# Patient Record
Sex: MALE | Race: OTHER | NOT HISPANIC OR LATINO | URBAN - METROPOLITAN AREA
[De-identification: names, ages, dates, MRNs, and addresses within clinical notes are randomized per-mention and may not be internally consistent; named-entity substitution may affect disease eponyms.]

---

## 2017-12-06 PROBLEM — Z00.00 ENCOUNTER FOR PREVENTIVE HEALTH EXAMINATION: Status: ACTIVE | Noted: 2017-12-06

## 2017-12-07 ENCOUNTER — OUTPATIENT (OUTPATIENT)
Dept: OUTPATIENT SERVICES | Facility: HOSPITAL | Age: 32
LOS: 1 days | End: 2017-12-07

## 2017-12-07 ENCOUNTER — APPOINTMENT (OUTPATIENT)
Dept: MRI IMAGING | Facility: CLINIC | Age: 32
End: 2017-12-07
Payer: COMMERCIAL

## 2017-12-07 DIAGNOSIS — Z00.8 ENCOUNTER FOR OTHER GENERAL EXAMINATION: ICD-10-CM

## 2017-12-07 PROCEDURE — 73221 MRI JOINT UPR EXTREM W/O DYE: CPT | Mod: 26,LT

## 2019-09-24 ENCOUNTER — APPOINTMENT (OUTPATIENT)
Dept: FAMILY MEDICINE | Facility: CLINIC | Age: 34
End: 2019-09-24
Payer: COMMERCIAL

## 2019-09-24 VITALS
BODY MASS INDEX: 26.37 KG/M2 | OXYGEN SATURATION: 98 % | HEART RATE: 78 BPM | WEIGHT: 168 LBS | HEIGHT: 67 IN | DIASTOLIC BLOOD PRESSURE: 76 MMHG | SYSTOLIC BLOOD PRESSURE: 122 MMHG | RESPIRATION RATE: 16 BRPM

## 2019-09-24 DIAGNOSIS — Z13.39 ENCOUNTER FOR SCREENING EXAM FOR OTHER MENTAL HEALTH AND BEHAVIORAL DISORDERS: ICD-10-CM

## 2019-09-24 DIAGNOSIS — Z13.31 ENCOUNTER FOR SCREENING FOR DEPRESSION: ICD-10-CM

## 2019-09-24 DIAGNOSIS — Z11.4 ENCOUNTER FOR SCREENING FOR HUMAN IMMUNODEFICIENCY VIRUS [HIV]: ICD-10-CM

## 2019-09-24 DIAGNOSIS — Z13.220 ENCOUNTER FOR SCREENING FOR LIPOID DISORDERS: ICD-10-CM

## 2019-09-24 DIAGNOSIS — Z23 ENCOUNTER FOR IMMUNIZATION: ICD-10-CM

## 2019-09-24 DIAGNOSIS — Z13.29 ENCOUNTER FOR SCREENING FOR OTHER SUSPECTED ENDOCRINE DISORDER: ICD-10-CM

## 2019-09-24 PROCEDURE — 99385 PREV VISIT NEW AGE 18-39: CPT | Mod: 25

## 2019-09-24 PROCEDURE — G0008: CPT

## 2019-09-24 PROCEDURE — G0442 ANNUAL ALCOHOL SCREEN 15 MIN: CPT

## 2019-09-24 PROCEDURE — 90472 IMMUNIZATION ADMIN EACH ADD: CPT

## 2019-09-24 PROCEDURE — 96127 BRIEF EMOTIONAL/BEHAV ASSMT: CPT

## 2019-09-24 PROCEDURE — 90715 TDAP VACCINE 7 YRS/> IM: CPT

## 2019-09-24 PROCEDURE — 90674 CCIIV4 VAC NO PRSV 0.5 ML IM: CPT

## 2019-09-24 NOTE — HISTORY OF PRESENT ILLNESS
[FreeTextEntry1] : annual [de-identified] : Mr. NICOLLE LEON is a 33 year old male presenting to [facility] care.\par Asthma, no symptoms for 15 years. Has not used an inhaler in 15 years.\par

## 2019-09-24 NOTE — ASSESSMENT
[FreeTextEntry1] : Annual\par Check labs\par Flu vaccine administered on right arm.\par Adacel administered on left arm

## 2019-09-24 NOTE — PHYSICAL EXAM
[Well Nourished] : well nourished [No Acute Distress] : no acute distress [Well Developed] : well developed [Well-Appearing] : well-appearing [Normal Sclera/Conjunctiva] : normal sclera/conjunctiva [PERRL] : pupils equal round and reactive to light [EOMI] : extraocular movements intact [Normal Outer Ear/Nose] : the outer ears and nose were normal in appearance [Normal Oropharynx] : the oropharynx was normal [No JVD] : no jugular venous distention [No Lymphadenopathy] : no lymphadenopathy [Supple] : supple [Thyroid Normal, No Nodules] : the thyroid was normal and there were no nodules present [No Respiratory Distress] : no respiratory distress  [No Accessory Muscle Use] : no accessory muscle use [Clear to Auscultation] : lungs were clear to auscultation bilaterally [Normal Rate] : normal rate  [Normal S1, S2] : normal S1 and S2 [Regular Rhythm] : with a regular rhythm [No Murmur] : no murmur heard [No Carotid Bruits] : no carotid bruits [No Varicosities] : no varicosities [No Abdominal Bruit] : a ~M bruit was not heard ~T in the abdomen [Pedal Pulses Present] : the pedal pulses are present [No Edema] : there was no peripheral edema [No Extremity Clubbing/Cyanosis] : no extremity clubbing/cyanosis [No Palpable Aorta] : no palpable aorta [Non Tender] : non-tender [Soft] : abdomen soft [Non-distended] : non-distended [No Masses] : no abdominal mass palpated [No HSM] : no HSM [Normal Bowel Sounds] : normal bowel sounds [Normal Anterior Cervical Nodes] : no anterior cervical lymphadenopathy [Normal Posterior Cervical Nodes] : no posterior cervical lymphadenopathy [No CVA Tenderness] : no CVA  tenderness [No Spinal Tenderness] : no spinal tenderness [No Joint Swelling] : no joint swelling [No Rash] : no rash [Grossly Normal Strength/Tone] : grossly normal strength/tone [Coordination Grossly Intact] : coordination grossly intact [No Focal Deficits] : no focal deficits [Normal Gait] : normal gait [Deep Tendon Reflexes (DTR)] : deep tendon reflexes were 2+ and symmetric [Normal Insight/Judgement] : insight and judgment were intact [Normal Affect] : the affect was normal

## 2019-09-24 NOTE — HEALTH RISK ASSESSMENT
[Excellent] : ~his/her~  mood as  excellent [2 - 4 times a month (2 pts)] : 2-4 times a month (2 points) [1 or 2 (0 pts)] : 1 or 2 (0 points) [Never (0 pts)] : Never (0 points) [Yes] : In the past 12 months have you used drugs other than those required for medical reasons? Yes [No falls in past year] : Patient reported no falls in the past year [0] : 2) Feeling down, depressed, or hopeless: Not at all (0) [None] : None [With Family] : lives with family [Employed] : employed [] :  [Sexually Active] : sexually active [Feels Safe at Home] : Feels safe at home [Smoke Detector] : smoke detector [Carbon Monoxide Detector] : carbon monoxide detector [Seat Belt] :  uses seat belt [Sunscreen] : uses sunscreen [] : No [de-identified] : Marijuana gummies [Audit-CScore] : 2 [de-identified] : tennis 2 times a week [HIV Test offered] : HIV Test offered [Change in mental status noted] : No change in mental status noted [Language] : denies difficulty with language [High Risk Behavior] : no high risk behavior [Reports changes in hearing] : Reports no changes in hearing [Reports changes in vision] : Reports no changes in vision [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 [AdvancecareDate] : 9/2019

## 2022-11-21 ENCOUNTER — APPOINTMENT (OUTPATIENT)
Dept: ORTHOPEDIC SURGERY | Facility: CLINIC | Age: 37
End: 2022-11-21
Payer: COMMERCIAL

## 2022-11-21 VITALS
HEART RATE: 85 BPM | DIASTOLIC BLOOD PRESSURE: 77 MMHG | SYSTOLIC BLOOD PRESSURE: 115 MMHG | HEIGHT: 67 IN | WEIGHT: 168 LBS | BODY MASS INDEX: 26.37 KG/M2

## 2022-11-21 DIAGNOSIS — Z78.9 OTHER SPECIFIED HEALTH STATUS: ICD-10-CM

## 2022-11-21 PROCEDURE — 73070 X-RAY EXAM OF ELBOW: CPT | Mod: RT

## 2022-11-21 PROCEDURE — 99203 OFFICE O/P NEW LOW 30 MIN: CPT

## 2022-11-22 PROBLEM — Z78.9 CURRENT NON-SMOKER: Status: ACTIVE | Noted: 2022-11-22

## 2022-11-22 PROBLEM — Z78.9 CONSUMES ALCOHOL OCCASIONALLY: Status: ACTIVE | Noted: 2022-11-22

## 2022-11-22 RX ORDER — DEXTROAMPHETAMINE SACCHARATE, AMPHETAMINE ASPARTATE, DEXTROAMPHETAMINE SULFATE, AND AMPHETAMINE SULFATE 3.75; 3.75; 3.75; 3.75 MG/1; MG/1; MG/1; MG/1
TABLET ORAL
Refills: 0 | Status: ACTIVE | COMMUNITY

## 2022-11-28 NOTE — ADDENDUM
[FreeTextEntry1] : This note was written by Mckenzie Pfeiffer on 11/28/2022 acting as a scribe for HORACIO OROURKE III, MD

## 2022-11-28 NOTE — HISTORY OF PRESENT ILLNESS
[7] : the ailment interference is 7/10 [3] : the ailment interference is 3/10 [8] : the ailment interference is 8/10 [(Does not interfere) 0] : the ailment interference is 0/10 (does not interfere) [10  (interferes completely)] : the ailment interference is10/10 (interferes completely) [4] : the ailment interference is 4/10 [de-identified] : The patient comes in today for his right elbow.  He states between work and  playing tennis, he has had pain over the last two months. The patient states the pain is constant, localized and radiating. The patient notes heat and rest makes his symptoms better, while using the arm and lifting heavy objects, makes his symptoms worse.   [] : No

## 2022-11-28 NOTE — CONSULT LETTER
[Dear  ___] : Dear  [unfilled], [FreeTextEntry1] : \par I had the pleasure of evaluating your patient, Ricardo Galaviz.\par \par Thank you very much for allowing me to participate in the care of this patient.\par Please see my note below.\par \par If you have any questions, please do not hesitate to contact me.\par \par Sincerely,\par \par \par Kory Ramirez III, MD\par [facility]/sg

## 2022-11-28 NOTE — DISCUSSION/SUMMARY
[de-identified] : At this time, due to tennis elbow of the right elbow, I recommend tennis elbow strapping, ice and anti-inflammatory.  He will be reassessed in three weeks.

## 2022-11-28 NOTE — PHYSICAL EXAM
[de-identified] : Left Elbow:\par Range of Motion in Degrees\par 	                                          Claimant:	Normal:	\par Flexion (Active)	                          170	170	\par Flexion (Passive)	                          170	170	\par Extension(Active)	                           0	                 0	\par Extension (Passive)	           0	                 0	\par Pronation/Supination (Active)	           0-180	 0-180	\par Pronation/Supination (Passive)          0-180             0-180	\par \par No tenderness to palpation over the medial and lateral epicondyle.  No pain with resisted dorsi or palmar flexion with .  No tenderness over the distal biceps.  No tenderness over the olecranon.  No swelling over the olecranon.  No instability to varus or valgus stress at 0, 30, 60 and 90 degrees.  No instability in the AP plane.  No motor or sensory deficits.  2+ radial and ulnar pulses.  Skin is intact.  No rashes, scars or lesions.\par \par Right Elbow:\par Range of Motion in Degrees:\par 	                                               Claimant:	        Normal:	\par Flexion (Active)	                                 170	          170	\par Flexion (Passive)	                                 170	          170	\par Extension(Active)	                                   0	            0	\par Extension (Passive)	                   0	            0	\par Pronation/Supination (Active)	                0-180	          0-180	\par Pronation/Supination (Passive)	0-180	          0-180	\par             \par Point tenderness over the lateral epicondyle.  No tenderness to palpation over the medial epicondyle.  Pain with resisted dorsi and palmar flexion with .  No tenderness over the distal biceps.  No tenderness over the olecranon.  No swelling over the olecranon.  No instability to varus or valgus stress at 0, 30, 60 and 90 degrees.  No instability in the AP plane.  No motor or sensory deficits.   2+ radial and ulnar pulses.  Skin is intact.  No rashes, scars or lesions. \par  [de-identified] : Ambulating with a slightly antalgic to antalgic gait.  Station:  Normal.  [de-identified] : Appearance:  Well-developed, well-nourished male in no acute distress.\par   [de-identified] : Radiographs, which were taken in the office today, two views of the right elbow, show no obvious osseous abnormality.

## 2022-12-12 ENCOUNTER — APPOINTMENT (OUTPATIENT)
Dept: ORTHOPEDIC SURGERY | Facility: CLINIC | Age: 37
End: 2022-12-12
Payer: COMMERCIAL

## 2022-12-12 VITALS
HEART RATE: 83 BPM | DIASTOLIC BLOOD PRESSURE: 82 MMHG | SYSTOLIC BLOOD PRESSURE: 122 MMHG | WEIGHT: 168 LBS | BODY MASS INDEX: 26.37 KG/M2 | HEIGHT: 67 IN

## 2022-12-12 DIAGNOSIS — M77.11 LATERAL EPICONDYLITIS, RIGHT ELBOW: ICD-10-CM

## 2022-12-12 PROCEDURE — 99213 OFFICE O/P EST LOW 20 MIN: CPT

## 2022-12-14 PROBLEM — M77.11 LATERAL EPICONDYLITIS OF RIGHT ELBOW: Status: ACTIVE | Noted: 2022-11-21

## 2022-12-14 NOTE — DISCUSSION/SUMMARY
[de-identified] : The patient presents with resolving tennis elbow, right.  At this time, I recommend he undergo a course of formal physical therapy and follow up with me in a month.

## 2022-12-14 NOTE — HISTORY OF PRESENT ILLNESS
[de-identified] : Bengali comes in today for his right elbow.  He states he feels much improved.  He played 2 1/2 hours of tennis.

## 2022-12-14 NOTE — PHYSICAL EXAM
[de-identified] : Right elbow:\par Elbow:_Range of Motion in Degrees:\par 	                                               Claimant:	        Normal:	\par Flexion (Active)	                                 170	          170	\par Flexion (Passive)	                                 170	          170	\par Extension(Active)	                                   0	            0	\par Extension (Passive)	                   0	            0	\par Pronation/Supination (Active)	                0-180	          0-180	\par Pronation/Supination (Passive)	0-180	          0-180	\par             \par Mild point tenderness over the lateral epicondyle.  No tenderness to palpation over the medial epicondyle.  Mild pain with resisted dorsi and palmar flexion with .  No tenderness over the distal biceps.  No tenderness over the olecranon.  No swelling over the olecranon.  No instability to varus or valgus stress at 0, 30, 60 and 90 degrees.  No instability in the AP plane.  No motor or sensory deficits.   2+ radial and ulnar pulses.  Skin is intact.  No rashes, scars or lesions.  [de-identified] : Gait: Normal    Station: Normal  [de-identified] : Appearance: Well-developed, well-nourished male  in no acute distress.

## 2022-12-14 NOTE — ADDENDUM
[FreeTextEntry1] : This note was written by Concepción Orellana on 12/14/2022 acting as scribe for Kory Ramirez III, MD

## 2023-01-09 ENCOUNTER — APPOINTMENT (OUTPATIENT)
Dept: ORTHOPEDIC SURGERY | Facility: CLINIC | Age: 38
End: 2023-01-09

## 2025-06-11 ENCOUNTER — NON-APPOINTMENT (OUTPATIENT)
Age: 40
End: 2025-06-11

## 2025-06-12 ENCOUNTER — NON-APPOINTMENT (OUTPATIENT)
Age: 40
End: 2025-06-12

## 2025-06-12 ENCOUNTER — APPOINTMENT (OUTPATIENT)
Dept: ORTHOPEDIC SURGERY | Facility: CLINIC | Age: 40
End: 2025-06-12

## 2025-06-12 VITALS — WEIGHT: 168 LBS | BODY MASS INDEX: 26.37 KG/M2 | HEIGHT: 67 IN

## 2025-06-12 PROCEDURE — 99214 OFFICE O/P EST MOD 30 MIN: CPT

## 2025-06-17 PROBLEM — S83.241A ACUTE MEDIAL MENISCUS TEAR OF RIGHT KNEE, INITIAL ENCOUNTER: Status: ACTIVE | Noted: 2025-06-13

## 2025-07-31 ENCOUNTER — APPOINTMENT (OUTPATIENT)
Dept: ORTHOPEDIC SURGERY | Facility: CLINIC | Age: 40
End: 2025-07-31

## 2025-08-07 ENCOUNTER — APPOINTMENT (OUTPATIENT)
Dept: ORTHOPEDIC SURGERY | Facility: CLINIC | Age: 40
End: 2025-08-07

## 2025-08-07 DIAGNOSIS — S83.411A SPRAIN OF MEDIAL COLLATERAL LIGAMENT OF RIGHT KNEE, INITIAL ENCOUNTER: ICD-10-CM

## 2025-08-07 PROCEDURE — 99213 OFFICE O/P EST LOW 20 MIN: CPT

## 2025-08-12 VITALS — WEIGHT: 168 LBS | BODY MASS INDEX: 26.37 KG/M2 | HEIGHT: 67 IN

## 2025-08-12 PROBLEM — S83.411A SPRAIN OF MEDIAL COLLATERAL LIGAMENT OF RIGHT KNEE, INITIAL ENCOUNTER: Status: ACTIVE | Noted: 2025-06-13
